# Patient Record
Sex: FEMALE | Race: WHITE | NOT HISPANIC OR LATINO | Employment: FULL TIME | URBAN - METROPOLITAN AREA
[De-identification: names, ages, dates, MRNs, and addresses within clinical notes are randomized per-mention and may not be internally consistent; named-entity substitution may affect disease eponyms.]

---

## 2023-03-10 PROBLEM — J45.909 ASTHMA (HHS-HCC): Status: ACTIVE | Noted: 2023-03-10

## 2023-03-10 PROBLEM — R53.81 MALAISE AND FATIGUE: Status: ACTIVE | Noted: 2023-03-10

## 2023-03-10 PROBLEM — M54.50 CHRONIC BILATERAL LOW BACK PAIN WITHOUT SCIATICA: Status: ACTIVE | Noted: 2023-03-10

## 2023-03-10 PROBLEM — S20.20XA: Status: ACTIVE | Noted: 2023-03-10

## 2023-03-10 PROBLEM — G89.29 CHRONIC BILATERAL LOW BACK PAIN WITHOUT SCIATICA: Status: ACTIVE | Noted: 2023-03-10

## 2023-03-10 PROBLEM — F41.1 GENERALIZED ANXIETY DISORDER: Status: ACTIVE | Noted: 2023-03-10

## 2023-03-10 PROBLEM — R53.83 MALAISE AND FATIGUE: Status: ACTIVE | Noted: 2023-03-10

## 2023-03-10 PROBLEM — E66.811 CLASS 1 OBESITY WITH BODY MASS INDEX (BMI) OF 34.0 TO 34.9 IN ADULT: Status: ACTIVE | Noted: 2023-03-10

## 2023-03-10 PROBLEM — F98.8 ATTENTION DEFICIT DISORDER: Status: ACTIVE | Noted: 2023-03-10

## 2023-03-10 PROBLEM — E66.9 CLASS 1 OBESITY WITH BODY MASS INDEX (BMI) OF 34.0 TO 34.9 IN ADULT: Status: ACTIVE | Noted: 2023-03-10

## 2023-03-10 RX ORDER — ALBUTEROL SULFATE 90 UG/1
1 AEROSOL, METERED RESPIRATORY (INHALATION) EVERY 4 HOURS PRN
COMMUNITY

## 2023-03-10 RX ORDER — ESCITALOPRAM OXALATE 20 MG/1
1 TABLET ORAL DAILY
COMMUNITY

## 2023-03-10 RX ORDER — LISDEXAMFETAMINE DIMESYLATE CAPSULES 20 MG/1
1 CAPSULE ORAL DAILY
COMMUNITY

## 2023-03-13 ENCOUNTER — OFFICE VISIT (OUTPATIENT)
Dept: PRIMARY CARE | Facility: CLINIC | Age: 28
End: 2023-03-13
Payer: COMMERCIAL

## 2023-03-13 VITALS
BODY MASS INDEX: 34.4 KG/M2 | SYSTOLIC BLOOD PRESSURE: 123 MMHG | TEMPERATURE: 97.6 F | WEIGHT: 216.4 LBS | DIASTOLIC BLOOD PRESSURE: 76 MMHG

## 2023-03-13 DIAGNOSIS — M54.9 DISCOMFORT OF BACK: Primary | ICD-10-CM

## 2023-03-13 PROBLEM — S20.20XA: Status: RESOLVED | Noted: 2023-03-10 | Resolved: 2023-03-13

## 2023-03-13 PROCEDURE — 1036F TOBACCO NON-USER: CPT | Performed by: NURSE PRACTITIONER

## 2023-03-13 PROCEDURE — 99213 OFFICE O/P EST LOW 20 MIN: CPT | Performed by: NURSE PRACTITIONER

## 2023-03-13 RX ORDER — ESCITALOPRAM OXALATE 5 MG/1
5 TABLET ORAL DAILY
COMMUNITY
Start: 2023-02-28

## 2023-03-13 ASSESSMENT — ENCOUNTER SYMPTOMS
FEVER: 0
CHILLS: 0
FATIGUE: 0

## 2023-03-13 NOTE — PROGRESS NOTES
Subjective   Jia Anthony is a 27 y.o. female who presents for Cyst (On the small of the back).    HPI   She presents to the office today for evaluation of a lump to the left lower back near spine. This was first noticed 6-9 months ago. She reports it is not painful unless touched frequently.  She does not feel the area is raised but feels different.  No redness or inflammation.  ? A little bit bigger.  No fever or chills.  She is feeling well otherwise.    Review of Systems   Constitutional:  Negative for chills, fatigue and fever.   Skin:  Negative for rash.       Objective   /76 (BP Location: Left arm, Patient Position: Sitting)   Temp 36.4 °C (97.6 °F) (Temporal)   Wt 98.2 kg (216 lb 6.4 oz)   BMI 34.40 kg/m²     Physical Exam  Constitutional:       General: She is not in acute distress.     Appearance: Normal appearance. She is not toxic-appearing.   Cardiovascular:      Rate and Rhythm: Normal rate and regular rhythm.      Heart sounds: Normal heart sounds, S1 normal and S2 normal. No murmur heard.  Lymphadenopathy:      Cervical: No cervical adenopathy.   Skin:     Comments: (+) ecchymosis noted to the area of concern on the lower back. No palpable lump but there is a palpable area just lateral to both sides of the spine that is mobile.    Neurological:      Mental Status: She is alert and oriented to person, place, and time.   Psychiatric:         Mood and Affect: Mood and affect normal.         Speech: Speech normal.         Behavior: Behavior normal.         Thought Content: Thought content normal.         Judgment: Judgment normal.         Assessment/Plan   Problem List Items Addressed This Visit          Other    Discomfort of back - Primary     Monitor the area to the lower back.  Return to the office if larger or bothersome.

## 2023-05-12 ENCOUNTER — OFFICE VISIT (OUTPATIENT)
Dept: PRIMARY CARE | Facility: CLINIC | Age: 28
End: 2023-05-12
Payer: COMMERCIAL

## 2023-05-12 ENCOUNTER — LAB (OUTPATIENT)
Dept: LAB | Facility: LAB | Age: 28
End: 2023-05-12
Payer: COMMERCIAL

## 2023-05-12 VITALS
BODY MASS INDEX: 33.78 KG/M2 | TEMPERATURE: 97.9 F | DIASTOLIC BLOOD PRESSURE: 77 MMHG | SYSTOLIC BLOOD PRESSURE: 113 MMHG | HEART RATE: 78 BPM | HEIGHT: 67 IN | WEIGHT: 215.2 LBS

## 2023-05-12 DIAGNOSIS — L29.9 ITCHING: ICD-10-CM

## 2023-05-12 DIAGNOSIS — L29.9 ITCHING: Primary | ICD-10-CM

## 2023-05-12 PROBLEM — Z86.14 HISTORY OF MRSA INFECTION: Status: RESOLVED | Noted: 2021-01-28 | Resolved: 2023-05-12

## 2023-05-12 PROBLEM — Z86.16 HISTORY OF COVID-19: Status: RESOLVED | Noted: 2021-01-28 | Resolved: 2023-05-12

## 2023-05-12 PROBLEM — O13.9 GESTATIONAL HYPERTENSION (HHS-HCC): Status: RESOLVED | Noted: 2021-01-28 | Resolved: 2023-05-12

## 2023-05-12 PROBLEM — F32.0 MAJOR DEPRESSIVE DISORDER, SINGLE EPISODE, MILD (CMS-HCC): Status: ACTIVE | Noted: 2022-01-11

## 2023-05-12 LAB
ALANINE AMINOTRANSFERASE (SGPT) (U/L) IN SER/PLAS: 22 U/L (ref 7–45)
ALBUMIN (G/DL) IN SER/PLAS: 4.1 G/DL (ref 3.4–5)
ALKALINE PHOSPHATASE (U/L) IN SER/PLAS: 40 U/L (ref 33–110)
ANION GAP IN SER/PLAS: 11 MMOL/L (ref 10–20)
ASPARTATE AMINOTRANSFERASE (SGOT) (U/L) IN SER/PLAS: 18 U/L (ref 9–39)
BILIRUBIN TOTAL (MG/DL) IN SER/PLAS: 0.5 MG/DL (ref 0–1.2)
CALCIUM (MG/DL) IN SER/PLAS: 9.3 MG/DL (ref 8.6–10.3)
CARBON DIOXIDE, TOTAL (MMOL/L) IN SER/PLAS: 25 MMOL/L (ref 21–32)
CHLORIDE (MMOL/L) IN SER/PLAS: 106 MMOL/L (ref 98–107)
CREATININE (MG/DL) IN SER/PLAS: 0.56 MG/DL (ref 0.5–1.05)
ERYTHROCYTE DISTRIBUTION WIDTH (RATIO) BY AUTOMATED COUNT: 13.3 % (ref 11.5–14.5)
ERYTHROCYTE MEAN CORPUSCULAR HEMOGLOBIN CONCENTRATION (G/DL) BY AUTOMATED: 33 G/DL (ref 32–36)
ERYTHROCYTE MEAN CORPUSCULAR VOLUME (FL) BY AUTOMATED COUNT: 87 FL (ref 80–100)
ERYTHROCYTES (10*6/UL) IN BLOOD BY AUTOMATED COUNT: 4.51 X10E12/L (ref 4–5.2)
GFR FEMALE: >90 ML/MIN/1.73M2
GLUCOSE (MG/DL) IN SER/PLAS: 82 MG/DL (ref 74–99)
HEMATOCRIT (%) IN BLOOD BY AUTOMATED COUNT: 39.4 % (ref 36–46)
HEMOGLOBIN (G/DL) IN BLOOD: 13 G/DL (ref 12–16)
LEUKOCYTES (10*3/UL) IN BLOOD BY AUTOMATED COUNT: 8.5 X10E9/L (ref 4.4–11.3)
PLATELETS (10*3/UL) IN BLOOD AUTOMATED COUNT: 274 X10E9/L (ref 150–450)
POTASSIUM (MMOL/L) IN SER/PLAS: 4.3 MMOL/L (ref 3.5–5.3)
PROTEIN TOTAL: 6.8 G/DL (ref 6.4–8.2)
SODIUM (MMOL/L) IN SER/PLAS: 138 MMOL/L (ref 136–145)
UREA NITROGEN (MG/DL) IN SER/PLAS: 10 MG/DL (ref 6–23)

## 2023-05-12 PROCEDURE — 1036F TOBACCO NON-USER: CPT | Performed by: NURSE PRACTITIONER

## 2023-05-12 PROCEDURE — 99213 OFFICE O/P EST LOW 20 MIN: CPT | Performed by: NURSE PRACTITIONER

## 2023-05-12 PROCEDURE — 85027 COMPLETE CBC AUTOMATED: CPT

## 2023-05-12 PROCEDURE — 36415 COLL VENOUS BLD VENIPUNCTURE: CPT

## 2023-05-12 PROCEDURE — 80053 COMPREHEN METABOLIC PANEL: CPT

## 2023-05-12 ASSESSMENT — ENCOUNTER SYMPTOMS
FEVER: 0
CHILLS: 0
FATIGUE: 0

## 2023-05-12 NOTE — PROGRESS NOTES
"Subjective   Jia Anthony is a 28 y.o. female who presents for Itching (Random all over).    HPI   She presents to the office today for evaluation of itching for 3 months. Generally arms,stomach, legs and back. Can affect random areas.  At times affects the scalp but never the face.  No change in shampoo or lotions.  No new products.  No new sheets or clothing.  She is 8 weeks pregnant- symptoms started before being pregnant.  No rash or hives.  Gets hives or red spots from scratching.  Stopped Vyvanse and Lexapro about 4 weeks ago.  Reports she has been really stressed recently from work.  No fever.  (+) chills at night since pregnant.  (+) nausea but no vomiting with pregnancy.  Feeling well otherwise.  No gardening or outdoor activity.    Changed shampoo to an anti-itch- helped a little bit    Review of Systems   Constitutional:  Negative for chills, fatigue and fever.   Skin:  Negative for rash.     Objective   /77   Pulse 78   Temp 36.6 °C (97.9 °F) (Temporal)   Ht 1.702 m (5' 7\")   Wt 97.6 kg (215 lb 3.2 oz)   BMI 33.71 kg/m²     Physical Exam  Constitutional:       General: She is not in acute distress.     Appearance: Normal appearance. She is not toxic-appearing.   Eyes:      Extraocular Movements: Extraocular movements intact.      Conjunctiva/sclera: Conjunctivae normal.      Pupils: Pupils are equal, round, and reactive to light.   Cardiovascular:      Rate and Rhythm: Normal rate and regular rhythm.      Pulses: Normal pulses.      Heart sounds: Normal heart sounds, S1 normal and S2 normal. No murmur heard.  Pulmonary:      Effort: Pulmonary effort is normal. No respiratory distress.      Breath sounds: Normal breath sounds.   Musculoskeletal:      Right lower leg: No edema.      Left lower leg: No edema.   Lymphadenopathy:      Cervical: No cervical adenopathy.   Skin:     General: Skin is warm.      Findings: No rash.   Neurological:      Mental Status: She is alert and oriented to " person, place, and time.   Psychiatric:         Attention and Perception: Attention normal.         Mood and Affect: Mood and affect normal.         Behavior: Behavior normal. Behavior is cooperative.         Thought Content: Thought content normal.         Cognition and Memory: Cognition normal.         Judgment: Judgment normal.         Assessment/Plan   Problem List Items Addressed This Visit          Nervous    Itching - Primary     No associated rash. Suspect due to stress. Will check labs and continue to monitor.         Relevant Orders    CBC    Comprehensive Metabolic Panel

## 2024-04-29 ENCOUNTER — APPOINTMENT (OUTPATIENT)
Dept: PRIMARY CARE | Facility: CLINIC | Age: 29
End: 2024-04-29
Payer: COMMERCIAL

## 2024-06-30 ASSESSMENT — PROMIS GLOBAL HEALTH SCALE
RATE_SOCIAL_SATISFACTION: VERY GOOD
RATE_QUALITY_OF_LIFE: GOOD
RATE_PHYSICAL_HEALTH: FAIR
EMOTIONAL_PROBLEMS: ALWAYS
CARRYOUT_PHYSICAL_ACTIVITIES: COMPLETELY
CARRYOUT_SOCIAL_ACTIVITIES: GOOD
RATE_AVERAGE_FATIGUE: MILD
RATE_MENTAL_HEALTH: FAIR
RATE_AVERAGE_PAIN: 0
RATE_GENERAL_HEALTH: GOOD

## 2024-07-01 ENCOUNTER — APPOINTMENT (OUTPATIENT)
Dept: PRIMARY CARE | Facility: CLINIC | Age: 29
End: 2024-07-01
Payer: COMMERCIAL

## 2024-07-01 VITALS
TEMPERATURE: 97.2 F | BODY MASS INDEX: 39.49 KG/M2 | DIASTOLIC BLOOD PRESSURE: 74 MMHG | OXYGEN SATURATION: 98 % | HEIGHT: 67 IN | SYSTOLIC BLOOD PRESSURE: 104 MMHG | HEART RATE: 91 BPM | WEIGHT: 251.6 LBS

## 2024-07-01 DIAGNOSIS — F32.0 MAJOR DEPRESSIVE DISORDER, SINGLE EPISODE, MILD (CMS-HCC): ICD-10-CM

## 2024-07-01 DIAGNOSIS — F41.1 GENERALIZED ANXIETY DISORDER: Primary | ICD-10-CM

## 2024-07-01 DIAGNOSIS — F98.8 ATTENTION DEFICIT DISORDER, UNSPECIFIED HYPERACTIVITY PRESENCE: ICD-10-CM

## 2024-07-01 PROCEDURE — 1036F TOBACCO NON-USER: CPT | Performed by: NURSE PRACTITIONER

## 2024-07-01 PROCEDURE — 99214 OFFICE O/P EST MOD 30 MIN: CPT | Performed by: NURSE PRACTITIONER

## 2024-07-01 RX ORDER — SERTRALINE HYDROCHLORIDE 50 MG/1
50 TABLET, FILM COATED ORAL DAILY
Qty: 30 TABLET | Refills: 1 | Status: SHIPPED | OUTPATIENT
Start: 2024-07-01

## 2024-07-01 ASSESSMENT — ENCOUNTER SYMPTOMS
NAUSEA: 0
VOMITING: 0
SLEEP DISTURBANCE: 0
HEADACHES: 1
COUGH: 0
NUMBNESS: 0
DIZZINESS: 0
CHILLS: 0
FATIGUE: 1
SHORTNESS OF BREATH: 0
DIARRHEA: 0
ABDOMINAL PAIN: 0
DYSPHORIC MOOD: 1
WEAKNESS: 0
FEVER: 0
CHEST TIGHTNESS: 0
NERVOUS/ANXIOUS: 1
PALPITATIONS: 0

## 2024-07-01 ASSESSMENT — ANXIETY QUESTIONNAIRES
5. BEING SO RESTLESS THAT IT IS HARD TO SIT STILL: NOT AT ALL
6. BECOMING EASILY ANNOYED OR IRRITABLE: MORE THAN HALF THE DAYS
4. TROUBLE RELAXING: NOT AT ALL
7. FEELING AFRAID AS IF SOMETHING AWFUL MIGHT HAPPEN: NEARLY EVERY DAY
1. FEELING NERVOUS, ANXIOUS, OR ON EDGE: NEARLY EVERY DAY
2. NOT BEING ABLE TO STOP OR CONTROL WORRYING: NEARLY EVERY DAY
3. WORRYING TOO MUCH ABOUT DIFFERENT THINGS: NEARLY EVERY DAY
GAD7 TOTAL SCORE: 14
IF YOU CHECKED OFF ANY PROBLEMS ON THIS QUESTIONNAIRE, HOW DIFFICULT HAVE THESE PROBLEMS MADE IT FOR YOU TO DO YOUR WORK, TAKE CARE OF THINGS AT HOME, OR GET ALONG WITH OTHER PEOPLE: SOMEWHAT DIFFICULT

## 2024-07-01 ASSESSMENT — PATIENT HEALTH QUESTIONNAIRE - PHQ9
SUM OF ALL RESPONSES TO PHQ9 QUESTIONS 1 AND 2: 0
1. LITTLE INTEREST OR PLEASURE IN DOING THINGS: NOT AT ALL
2. FEELING DOWN, DEPRESSED OR HOPELESS: NOT AT ALL

## 2024-07-01 NOTE — PROGRESS NOTES
Subjective    Jia Anthony is a 29 y.o. female who presents for Anxiety (Pt would like to restart medication- she stopped taking her medication when she pregnant. ).    Anxiety  Symptoms include nervous/anxious behavior. Patient reports no chest pain, dizziness, nausea, palpitations, shortness of breath or suicidal ideas.       She presents to the office today to discuss anxiety and depression.  She is 6 months postpartum.  Has been off her medications since pregnancy.  Did not really feel Lexapro was helping much prior to stopping.  Feeling sad at times  (+)emotional  Rarely feeling down, helpless or hopeless.  Motivation is ok  No SI or HI.  (+) excessive worry.  (+) intrusive thoughts about worst case scenarios  No panic attacks.  Sleep is disrupted with baby waking up but otherwise would be sleeping well.   Diet: Could be better. Working on healthier eating  Has had increased hunger since breastfeeding.  Exercise: Goes on walks- no scheduled exercise  Caffeine use: 100 mg or less  Alcohol use: none  Drug use: None    She reports she is also having difficulty focusing.   Has a hard time getting focused to start a task,  Gets side tracked and doesn't finish things.  This contributes to mind racing.   Would like to restart Vyvanse once done breastfeeding.    Review of Systems   Constitutional:  Positive for fatigue. Negative for chills and fever.   Eyes:  Negative for visual disturbance.   Respiratory:  Negative for cough, chest tightness and shortness of breath.    Cardiovascular:  Negative for chest pain, palpitations and leg swelling.   Gastrointestinal:  Negative for abdominal pain, diarrhea, nausea and vomiting.   Neurological:  Positive for headaches. Negative for dizziness, weakness and numbness.   Psychiatric/Behavioral:  Positive for dysphoric mood. Negative for self-injury, sleep disturbance and suicidal ideas. The patient is nervous/anxious.        Objective   /74 (BP Location: Left arm, Patient  "Position: Sitting)   Pulse 91   Temp 36.2 °C (97.2 °F) (Temporal)   Ht 1.69 m (5' 6.54\")   Wt 114 kg (251 lb 9.6 oz)   SpO2 98%   BMI 39.96 kg/m²     Physical Exam  Constitutional:       General: She is not in acute distress.     Appearance: Normal appearance. She is not toxic-appearing.   Eyes:      Extraocular Movements: Extraocular movements intact.      Conjunctiva/sclera: Conjunctivae normal.      Pupils: Pupils are equal, round, and reactive to light.   Neck:      Thyroid: No thyroid mass or thyromegaly.   Cardiovascular:      Rate and Rhythm: Normal rate and regular rhythm.      Pulses: Normal pulses.      Heart sounds: Normal heart sounds, S1 normal and S2 normal. No murmur heard.  Pulmonary:      Effort: Pulmonary effort is normal. No respiratory distress.      Breath sounds: Normal breath sounds.   Abdominal:      General: Bowel sounds are normal.      Palpations: Abdomen is soft.      Tenderness: There is no abdominal tenderness.   Musculoskeletal:      Right lower leg: No edema.      Left lower leg: No edema.   Lymphadenopathy:      Cervical: No cervical adenopathy.   Neurological:      Mental Status: She is alert and oriented to person, place, and time.   Psychiatric:         Attention and Perception: Attention normal.         Mood and Affect: Mood and affect normal.         Behavior: Behavior normal. Behavior is cooperative.         Thought Content: Thought content normal.         Cognition and Memory: Cognition normal.         Judgment: Judgment normal.         Assessment/Plan   Problem List Items Addressed This Visit       Attention deficit disorder     Will restart Vyvanse once done breastfeeding.         Generalized anxiety disorder - Primary     Start Sertraline as ordered. May increase if needed.           Relevant Medications    sertraline (Zoloft) 50 mg tablet    Major depressive disorder, single episode, mild (CMS-HCC)    Relevant Medications    sertraline (Zoloft) 50 mg tablet       It " has been a pleasure seeing you today!

## 2024-08-14 ENCOUNTER — APPOINTMENT (OUTPATIENT)
Dept: PRIMARY CARE | Facility: CLINIC | Age: 29
End: 2024-08-14
Payer: COMMERCIAL

## 2024-08-14 DIAGNOSIS — F41.1 GENERALIZED ANXIETY DISORDER: Primary | ICD-10-CM

## 2024-08-14 DIAGNOSIS — F32.0 MAJOR DEPRESSIVE DISORDER, SINGLE EPISODE, MILD (CMS-HCC): ICD-10-CM

## 2024-08-14 PROCEDURE — 99213 OFFICE O/P EST LOW 20 MIN: CPT | Performed by: NURSE PRACTITIONER

## 2024-08-14 PROCEDURE — 1036F TOBACCO NON-USER: CPT | Performed by: NURSE PRACTITIONER

## 2024-08-14 RX ORDER — SERTRALINE HYDROCHLORIDE 50 MG/1
50 TABLET, FILM COATED ORAL DAILY
Qty: 90 TABLET | Refills: 0 | Status: SHIPPED | OUTPATIENT
Start: 2024-08-14

## 2024-08-14 ASSESSMENT — PATIENT HEALTH QUESTIONNAIRE - PHQ9
1. LITTLE INTEREST OR PLEASURE IN DOING THINGS: NOT AT ALL
SUM OF ALL RESPONSES TO PHQ9 QUESTIONS 1 AND 2: 0
2. FEELING DOWN, DEPRESSED OR HOPELESS: NOT AT ALL

## 2024-08-14 ASSESSMENT — ENCOUNTER SYMPTOMS
FEVER: 0
FATIGUE: 1
SLEEP DISTURBANCE: 0
CHILLS: 0
NERVOUS/ANXIOUS: 1
DYSPHORIC MOOD: 0

## 2024-08-14 NOTE — PROGRESS NOTES
Subjective   Jia Anthony is a 29 y.o. female who presents for 6 wk fu (On Virtual).    An interactive audio and video telecommunication system which permits real time communications between the patient (at the originating site) and provider (at the distant site) was utilized to provide this telehealth service.    Verbal consent was requested and obtained from Jia Anthony for a telehealth visit. All issues as below were discussed and addressed but no physical exam was performed. If it was felt that the patient should be evaluated in the clinic then they were directed there. The patient verbally consented to the visit.  She was located in the [facility] for the visit.       HPI  She presents today virtually for a follow-up on anxiety.  She has been on the Sertraline for about 4 weeks.  She is taking the medication as prescribed. Taking daily in the a.m. or afternoon.  No significant side effects noted.  She reports she is tired and has had mild headaches but feels this is due to staying up late.  No feeling sad, down, helpless or hopeless.  Motivation is average.  No SI or HI.  Anxiety has improved since starting the medication.  It seems to be taking the edge off.  Her every day anxiety is better.  She does not worry about little things every day.  However, big things still create a lot of anxiety for her.  No panic attacks reported.  Sleeping okay at night  Diet: Overall pretty healthy.  Exercise: Taking walks with kids.  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  She is in the process of weaning from breast-feeding.  She is down to just her nightly nursing session.  As soon as she is done breast-feeding, she would like to get back on the Vyvanse.    Review of Systems   Constitutional:  Positive for fatigue. Negative for chills and fever.   Psychiatric/Behavioral:  Negative for dysphoric mood, self-injury, sleep disturbance and suicidal ideas. The patient is nervous/anxious.        Objective    There were no vitals taken for this visit.    Physical Exam  Constitutional:       General: She is not in acute distress.     Appearance: Normal appearance. She is not toxic-appearing.   Pulmonary:      Effort: Pulmonary effort is normal. No respiratory distress.   Neurological:      Mental Status: She is alert and oriented to person, place, and time.   Psychiatric:         Mood and Affect: Mood normal.         Behavior: Behavior normal.         Thought Content: Thought content normal.         Judgment: Judgment normal.         Assessment/Plan   Problem List Items Addressed This Visit       Generalized anxiety disorder - Primary     Improved on the current dose of Sertraline.  Will continue at the current dose.  Plan to follow-up in 6 to 8 weeks.  Will increase medication at that time if needed.         Relevant Medications    sertraline (Zoloft) 50 mg tablet    Major depressive disorder, single episode, mild (CMS-HCC)     Stable on the current dose of Sertraline.         Relevant Medications    sertraline (Zoloft) 50 mg tablet       It has been a pleasure seeing you today!

## 2024-08-14 NOTE — ASSESSMENT & PLAN NOTE
Improved on the current dose of Sertraline.  Will continue at the current dose.  Plan to follow-up in 6 to 8 weeks.  Will increase medication at that time if needed.

## 2024-09-24 ENCOUNTER — APPOINTMENT (OUTPATIENT)
Dept: PRIMARY CARE | Facility: CLINIC | Age: 29
End: 2024-09-24
Payer: COMMERCIAL

## 2024-11-01 ENCOUNTER — APPOINTMENT (OUTPATIENT)
Dept: PRIMARY CARE | Facility: CLINIC | Age: 29
End: 2024-11-01
Payer: COMMERCIAL

## 2024-11-01 VITALS
DIASTOLIC BLOOD PRESSURE: 90 MMHG | SYSTOLIC BLOOD PRESSURE: 112 MMHG | WEIGHT: 258.4 LBS | HEART RATE: 84 BPM | OXYGEN SATURATION: 98 % | BODY MASS INDEX: 41.04 KG/M2 | TEMPERATURE: 97.6 F

## 2024-11-01 DIAGNOSIS — F90.9 ATTENTION DEFICIT HYPERACTIVITY DISORDER (ADHD), UNSPECIFIED ADHD TYPE: Primary | ICD-10-CM

## 2024-11-01 DIAGNOSIS — F41.1 GENERALIZED ANXIETY DISORDER: ICD-10-CM

## 2024-11-01 DIAGNOSIS — Z51.81 THERAPEUTIC DRUG MONITORING: ICD-10-CM

## 2024-11-01 DIAGNOSIS — F32.0 MAJOR DEPRESSIVE DISORDER, SINGLE EPISODE, MILD (CMS-HCC): ICD-10-CM

## 2024-11-01 LAB
AMPHETAMINES UR QL SCN: NORMAL
BARBITURATES UR QL SCN: NORMAL
BENZODIAZ UR QL SCN: NORMAL
BZE UR QL SCN: NORMAL
CANNABINOIDS UR QL SCN: NORMAL
FENTANYL+NORFENTANYL UR QL SCN: NORMAL
METHADONE UR QL SCN: NORMAL
OPIATES UR QL SCN: NORMAL
OXYCODONE+OXYMORPHONE UR QL SCN: NORMAL
PCP UR QL SCN: NORMAL

## 2024-11-01 PROCEDURE — 99214 OFFICE O/P EST MOD 30 MIN: CPT | Performed by: NURSE PRACTITIONER

## 2024-11-01 PROCEDURE — 80307 DRUG TEST PRSMV CHEM ANLYZR: CPT

## 2024-11-01 RX ORDER — LISDEXAMFETAMINE DIMESYLATE 20 MG/1
20 CAPSULE ORAL EVERY MORNING
Qty: 30 CAPSULE | Refills: 0 | Status: SHIPPED | OUTPATIENT
Start: 2024-12-31 | End: 2025-01-30

## 2024-11-01 RX ORDER — LISDEXAMFETAMINE DIMESYLATE 20 MG/1
20 CAPSULE ORAL EVERY MORNING
Qty: 30 CAPSULE | Refills: 0 | Status: SHIPPED | OUTPATIENT
Start: 2024-11-01 | End: 2024-12-01

## 2024-11-01 RX ORDER — LISDEXAMFETAMINE DIMESYLATE 20 MG/1
20 CAPSULE ORAL EVERY MORNING
Qty: 30 CAPSULE | Refills: 0 | Status: SHIPPED | OUTPATIENT
Start: 2024-12-01 | End: 2024-12-31

## 2024-11-02 ASSESSMENT — ENCOUNTER SYMPTOMS
CHILLS: 0
FATIGUE: 0
NERVOUS/ANXIOUS: 1
FEVER: 0
SLEEP DISTURBANCE: 0
DYSPHORIC MOOD: 0
SHORTNESS OF BREATH: 0

## 2025-01-17 ENCOUNTER — APPOINTMENT (OUTPATIENT)
Dept: PRIMARY CARE | Facility: CLINIC | Age: 30
End: 2025-01-17
Payer: COMMERCIAL

## 2025-01-17 VITALS
BODY MASS INDEX: 39.83 KG/M2 | SYSTOLIC BLOOD PRESSURE: 112 MMHG | OXYGEN SATURATION: 97 % | DIASTOLIC BLOOD PRESSURE: 88 MMHG | HEART RATE: 96 BPM | TEMPERATURE: 97.5 F | WEIGHT: 250.8 LBS

## 2025-01-17 DIAGNOSIS — F41.1 GENERALIZED ANXIETY DISORDER: ICD-10-CM

## 2025-01-17 DIAGNOSIS — F90.9 ATTENTION DEFICIT HYPERACTIVITY DISORDER (ADHD), UNSPECIFIED ADHD TYPE: Primary | ICD-10-CM

## 2025-01-17 DIAGNOSIS — F32.0 MAJOR DEPRESSIVE DISORDER, SINGLE EPISODE, MILD (CMS-HCC): ICD-10-CM

## 2025-01-17 PROCEDURE — 99214 OFFICE O/P EST MOD 30 MIN: CPT | Performed by: NURSE PRACTITIONER

## 2025-01-17 PROCEDURE — 1036F TOBACCO NON-USER: CPT | Performed by: NURSE PRACTITIONER

## 2025-01-17 RX ORDER — LISDEXAMFETAMINE DIMESYLATE 20 MG/1
20 CAPSULE ORAL EVERY MORNING
Qty: 30 CAPSULE | Refills: 0 | Status: SHIPPED | OUTPATIENT
Start: 2025-03-31 | End: 2025-04-30

## 2025-01-17 RX ORDER — SERTRALINE HYDROCHLORIDE 50 MG/1
50 TABLET, FILM COATED ORAL DAILY
Qty: 90 TABLET | Refills: 0 | Status: SHIPPED | OUTPATIENT
Start: 2025-01-17

## 2025-01-17 RX ORDER — LISDEXAMFETAMINE DIMESYLATE 20 MG/1
20 CAPSULE ORAL EVERY MORNING
Qty: 30 CAPSULE | Refills: 0 | Status: SHIPPED | OUTPATIENT
Start: 2025-01-30 | End: 2025-03-01

## 2025-01-17 RX ORDER — LISDEXAMFETAMINE DIMESYLATE 20 MG/1
20 CAPSULE ORAL EVERY MORNING
Qty: 30 CAPSULE | Refills: 0 | Status: SHIPPED | OUTPATIENT
Start: 2025-03-01 | End: 2025-03-31

## 2025-01-17 ASSESSMENT — ENCOUNTER SYMPTOMS
SLEEP DISTURBANCE: 0
DIZZINESS: 0
FEVER: 0
FATIGUE: 0
WEAKNESS: 0
HEADACHES: 0
DYSPHORIC MOOD: 0
NAUSEA: 0
PALPITATIONS: 0
CHILLS: 0
COUGH: 0
NUMBNESS: 0
ABDOMINAL PAIN: 0
CHEST TIGHTNESS: 0
VOMITING: 0
DIARRHEA: 0
SHORTNESS OF BREATH: 0
NERVOUS/ANXIOUS: 1

## 2025-01-17 ASSESSMENT — PATIENT HEALTH QUESTIONNAIRE - PHQ9
2. FEELING DOWN, DEPRESSED OR HOPELESS: NOT AT ALL
SUM OF ALL RESPONSES TO PHQ9 QUESTIONS 1 AND 2: 0
1. LITTLE INTEREST OR PLEASURE IN DOING THINGS: NOT AT ALL

## 2025-01-17 NOTE — PATIENT INSTRUCTIONS
Restart Sertraline-take 25 mg (a half tablet) daily for 1 week, then increase to 50 mg daily (1 tablet).  Let me know how you are doing in 6 weeks.  Continue Vyvanse at the current dose.  Plan to follow-up in 3 months.

## 2025-01-17 NOTE — ASSESSMENT & PLAN NOTE
Restart sertraline 25 mg for 1 week, then increase to 50 mg daily.  She will update me in 6 weeks on how she is doing.  Plan to follow-up in 3 months.

## 2025-01-17 NOTE — PROGRESS NOTES
Subjective   Jia Anthony is a 29 y.o. female who presents for 2 mon fu.    HPI  She presents to the office today for medication refills and follow up.  She reports she is doing well on the Vyvanse. She is taking around 6:30 am-8:30 am. No side effects other than anxiety is heightened.   No chest pain, SOB or palpitations.   (+) soreness in the right armpit into chest- switched up bra and the way she is holding baby and it is improving.    OARRS:  Josefina Thompson, MANDEEP-CNP on 1/17/2025  8:36 AM  I have personally reviewed the OARRS report for Jia Anthony. I have considered the risks of abuse, dependence, addiction and diversion and I believe that it is clinically appropriate for Jia Anthony to be prescribed this medication.    Is the patient prescribed a combination of a benzodiazepine and opioid?  No    Last Urine Drug Screen / ordered today: Yes  Recent Results (from the past 8760 hours)   Drug Screen, Urine With Reflex to Confirmation    Collection Time: 11/01/24  3:22 PM   Result Value Ref Range    Amphetamine Screen, Urine Presumptive Negative Presumptive Negative    Barbiturate Screen, Urine Presumptive Negative Presumptive Negative    Benzodiazepines Screen, Urine Presumptive Negative Presumptive Negative    Cannabinoid Screen, Urine Presumptive Negative Presumptive Negative    Cocaine Metabolite Screen, Urine Presumptive Negative Presumptive Negative    Fentanyl Screen, Urine Presumptive Negative Presumptive Negative    Opiate Screen, Urine Presumptive Negative Presumptive Negative    Oxycodone Screen, Urine Presumptive Negative Presumptive Negative    PCP Screen, Urine Presumptive Negative Presumptive Negative    Methadone Screen, Urine Presumptive Negative Presumptive Negative     Results are as expected.         Controlled Substance Agreement:  Date of the Last Agreement: 11/1/2024  Reviewed Controlled Substance Agreement including but not limited to the benefits, risks, and alternatives to  treatment with a Controlled Substance medication(s).    Stimulants:   What is the patient's goal of therapy? Help with focus at work and at home    Is this being achieved with current treatment? yes    Activities of Daily Living:   Is your overall impression that this patient is benefiting (symptom reduction outweighs side effects) from stimulant therapy? Yes     1. Physical Functioning: Better  2. Family Relationship: Same  3. Social Relationship: Same  4. Mood: Better  5. Sleep Patterns: Same  6. Overall Function: Better    No feeling sad, down, helpless or hopeless.  Motivation is fair.  No SI or HI.  Does have some social anxiety as well. Very anxious when going places or in group settings.  (+) excessive worry.  (+) worry about worry about worst case scenarios- generally health related  (+) panic attacks once every 3 months -hyperventilates, overwhelmed and very anxious  Sleeping well at night.  Diet: Overall pretty healthy  Exercise: No scheduled exercise  Caffeine use: 100-150 mg of caffeine  Alcohol use: rare use  Drug use: None    Review of Systems   Constitutional:  Negative for chills, fatigue and fever.   Eyes:  Negative for visual disturbance.   Respiratory:  Negative for cough, chest tightness and shortness of breath.    Cardiovascular:  Negative for chest pain, palpitations and leg swelling.   Gastrointestinal:  Negative for abdominal pain, diarrhea, nausea and vomiting.   Neurological:  Negative for dizziness, weakness, numbness and headaches.   Psychiatric/Behavioral:  Negative for dysphoric mood, self-injury, sleep disturbance and suicidal ideas. The patient is nervous/anxious.        Objective   /88 (BP Location: Left arm, Patient Position: Sitting)   Pulse 96   Temp 36.4 °C (97.5 °F) (Temporal)   Wt 114 kg (250 lb 12.8 oz)   SpO2 97%   BMI 39.83 kg/m²     Physical Exam  Constitutional:       General: She is not in acute distress.     Appearance: Normal appearance. She is not  toxic-appearing.   Eyes:      Extraocular Movements: Extraocular movements intact.      Conjunctiva/sclera: Conjunctivae normal.      Pupils: Pupils are equal, round, and reactive to light.   Cardiovascular:      Rate and Rhythm: Normal rate and regular rhythm.      Pulses: Normal pulses.      Heart sounds: Normal heart sounds, S1 normal and S2 normal. No murmur heard.  Pulmonary:      Effort: Pulmonary effort is normal. No respiratory distress.      Breath sounds: Normal breath sounds.   Abdominal:      General: Bowel sounds are normal.      Palpations: Abdomen is soft.      Tenderness: There is no abdominal tenderness.   Musculoskeletal:      Right lower leg: No edema.      Left lower leg: No edema.   Lymphadenopathy:      Cervical: No cervical adenopathy.   Neurological:      Mental Status: She is alert and oriented to person, place, and time.   Psychiatric:         Attention and Perception: Attention normal.         Mood and Affect: Mood and affect normal.         Behavior: Behavior normal. Behavior is cooperative.         Thought Content: Thought content normal.         Cognition and Memory: Cognition normal.         Judgment: Judgment normal.         Assessment/Plan   Problem List Items Addressed This Visit       Attention deficit disorder - Primary     Well-controlled the current dose of Vyvanse.  Continue.  UDS and CSA up-to-date.         Relevant Medications    lisdexamfetamine (Vyvanse) 20 mg capsule (Start on 1/30/2025)    lisdexamfetamine (Vyvanse) 20 mg capsule (Start on 3/1/2025)    lisdexamfetamine (Vyvanse) 20 mg capsule (Start on 3/31/2025)    Generalized anxiety disorder     Restart sertraline 25 mg for 1 week, then increase to 50 mg daily.  She will update me in 6 weeks on how she is doing.  Plan to follow-up in 3 months.         Relevant Medications    sertraline (Zoloft) 50 mg tablet    Major depressive disorder, single episode, mild (CMS-HCC)     Stable- no depression currently.          It has  been a pleasure seeing you today!

## 2025-01-28 ENCOUNTER — APPOINTMENT (OUTPATIENT)
Dept: PRIMARY CARE | Facility: CLINIC | Age: 30
End: 2025-01-28
Payer: COMMERCIAL

## 2025-02-17 ENCOUNTER — APPOINTMENT (OUTPATIENT)
Dept: PRIMARY CARE | Facility: CLINIC | Age: 30
End: 2025-02-17
Payer: COMMERCIAL

## 2025-04-01 DIAGNOSIS — F90.9 ATTENTION DEFICIT HYPERACTIVITY DISORDER (ADHD), UNSPECIFIED ADHD TYPE: ICD-10-CM

## 2025-04-02 RX ORDER — LISDEXAMFETAMINE DIMESYLATE 20 MG/1
20 CAPSULE ORAL EVERY MORNING
Qty: 30 CAPSULE | Refills: 0 | Status: SHIPPED | OUTPATIENT
Start: 2025-04-02 | End: 2025-05-02

## 2025-04-23 ENCOUNTER — APPOINTMENT (OUTPATIENT)
Dept: PRIMARY CARE | Facility: CLINIC | Age: 30
End: 2025-04-23
Payer: COMMERCIAL

## 2025-04-23 VITALS
OXYGEN SATURATION: 97 % | WEIGHT: 242.8 LBS | BODY MASS INDEX: 38.56 KG/M2 | SYSTOLIC BLOOD PRESSURE: 118 MMHG | TEMPERATURE: 97.6 F | HEART RATE: 91 BPM | DIASTOLIC BLOOD PRESSURE: 78 MMHG

## 2025-04-23 DIAGNOSIS — Z00.00 HEALTHCARE MAINTENANCE: Primary | ICD-10-CM

## 2025-04-23 DIAGNOSIS — Z13.6 SCREENING FOR CARDIOVASCULAR CONDITION: ICD-10-CM

## 2025-04-23 DIAGNOSIS — F41.1 GENERALIZED ANXIETY DISORDER: ICD-10-CM

## 2025-04-23 DIAGNOSIS — Z13.0 SCREENING FOR DEFICIENCY ANEMIA: ICD-10-CM

## 2025-04-23 DIAGNOSIS — F90.9 ATTENTION DEFICIT HYPERACTIVITY DISORDER (ADHD), UNSPECIFIED ADHD TYPE: ICD-10-CM

## 2025-04-23 PROCEDURE — 99214 OFFICE O/P EST MOD 30 MIN: CPT | Performed by: NURSE PRACTITIONER

## 2025-04-23 PROCEDURE — 1036F TOBACCO NON-USER: CPT | Performed by: NURSE PRACTITIONER

## 2025-04-23 PROCEDURE — 99395 PREV VISIT EST AGE 18-39: CPT | Performed by: NURSE PRACTITIONER

## 2025-04-23 RX ORDER — SERTRALINE HYDROCHLORIDE 50 MG/1
50 TABLET, FILM COATED ORAL DAILY
Qty: 90 TABLET | Refills: 1 | Status: SHIPPED | OUTPATIENT
Start: 2025-04-23

## 2025-04-23 RX ORDER — LISDEXAMFETAMINE DIMESYLATE 30 MG/1
30 CAPSULE ORAL EVERY MORNING
Qty: 30 CAPSULE | Refills: 0 | Status: SHIPPED | OUTPATIENT
Start: 2025-05-02 | End: 2025-06-01

## 2025-04-23 RX ORDER — LISDEXAMFETAMINE DIMESYLATE 30 MG/1
30 CAPSULE ORAL EVERY MORNING
Qty: 30 CAPSULE | Refills: 0 | Status: SHIPPED | OUTPATIENT
Start: 2025-07-01 | End: 2025-07-31

## 2025-04-23 RX ORDER — LISDEXAMFETAMINE DIMESYLATE 30 MG/1
30 CAPSULE ORAL EVERY MORNING
Qty: 30 CAPSULE | Refills: 0 | Status: SHIPPED | OUTPATIENT
Start: 2025-06-01 | End: 2025-07-01

## 2025-04-23 ASSESSMENT — ENCOUNTER SYMPTOMS
FEVER: 0
RHINORRHEA: 0
UNEXPECTED WEIGHT CHANGE: 0
CHILLS: 0
ABDOMINAL PAIN: 0
ARTHRALGIAS: 0
POLYDIPSIA: 0
SPEECH DIFFICULTY: 0
FREQUENCY: 0
POLYPHAGIA: 0
EYE ITCHING: 0
DIZZINESS: 0
NAUSEA: 0
BACK PAIN: 1
DIFFICULTY URINATING: 0
HEADACHES: 0
SORE THROAT: 0
WEAKNESS: 0
NECK PAIN: 0
EYE REDNESS: 0
PALPITATIONS: 0
WHEEZING: 0
DYSPHORIC MOOD: 0
NERVOUS/ANXIOUS: 1
DYSURIA: 0
CONSTIPATION: 0
BRUISES/BLEEDS EASILY: 0
ADENOPATHY: 0
EYE PAIN: 0
VOMITING: 0
MYALGIAS: 0
SINUS PRESSURE: 0
PHOTOPHOBIA: 0
SLEEP DISTURBANCE: 0
DIARRHEA: 0
CHEST TIGHTNESS: 0
SHORTNESS OF BREATH: 0
HEMATURIA: 0
COUGH: 0
NUMBNESS: 0
BLOOD IN STOOL: 0
FATIGUE: 0
EYE DISCHARGE: 0

## 2025-04-23 NOTE — PROGRESS NOTES
Subjective   Jia Anthony is a 29 y.o. female who presents for Med Refill and Annual Exam (Pt is fasting. GYN: follows with [facility]. LMP: approx ).    Med Refill  Pertinent negatives include no abdominal pain, arthralgias, chest pain, chills, congestion, coughing, fatigue, fever, headaches, myalgias, nausea, neck pain, numbness, rash, sore throat, vomiting or weakness.     Last well exam: 3 years ago  Health has been good in general.   There have been no changes in the patients PMH, PSH, FH or social history. Reviewed today.  Diet: trying to eat more protein/ eating healthier  Exercise: Doing [employer] in [place] for the past 2 weeks-3 days a week  Dental: Regular dental exams. Brushes 2 times a day. Flosses occasionally.  Vision: Last eye exam:2024    Corrected with glasses  Tobacco Use: None  Alcohol Use:None  Sexually active:  and sexually active.    LMP: 3/28/2025  Starting to be more regular.  Duration: 3-5 days.  No cramps or heavy bleeding.  PAP: follows with GYN- due for a PAP  Birth control: condoms  Immunizations: UTD  Colonoscopy: No family history of colon cancer.  Mammogram: No family history of breast cancer.    Last labs: ordered today    She is taking the Sertraline as prescribed. Taking daily at night.  No side effects.  No feeling sad, down, helpless or hopeless. Occasionally feels down.  Motivation is pretty good.   No SI or HI.  Anxiety is better in general  Less excessive worry.  (+) worry about worst case scenarios ( better on the medication)  No panic attacks.  Counseling: not currently in therapy  Sleeping well a night   Caffeine use: under 100 mg a day  Alcohol use: rare- 1-2 times a year  Drug use: None    OARRS:  Josefina Thompson, MANDEEP-JEREMY on 2025  9:19 AM  I have personally reviewed the OARRS report for Jia Anthony. I have considered the risks of abuse, dependence, addiction and diversion and I believe that it is clinically appropriate for  Jia Anthony to be prescribed this medication    Is the patient prescribed a combination of a benzodiazepine and opioid?  No    Last Urine Drug Screen / ordered today: No  Recent Results (from the past 8760 hours)   Drug Screen, Urine With Reflex to Confirmation    Collection Time: 11/01/24  3:22 PM   Result Value Ref Range    Amphetamine Screen, Urine Presumptive Negative Presumptive Negative    Barbiturate Screen, Urine Presumptive Negative Presumptive Negative    Benzodiazepines Screen, Urine Presumptive Negative Presumptive Negative    Cannabinoid Screen, Urine Presumptive Negative Presumptive Negative    Cocaine Metabolite Screen, Urine Presumptive Negative Presumptive Negative    Fentanyl Screen, Urine Presumptive Negative Presumptive Negative    Opiate Screen, Urine Presumptive Negative Presumptive Negative    Oxycodone Screen, Urine Presumptive Negative Presumptive Negative    PCP Screen, Urine Presumptive Negative Presumptive Negative    Methadone Screen, Urine Presumptive Negative Presumptive Negative     Results are as expected.         Controlled Substance Agreement:  Date of the Last Agreement: 11/1/2024  Reviewed Controlled Substance Agreement including but not limited to the benefits, risks, and alternatives to treatment with a Controlled Substance medication(s).    Stimulants:   What is the patient's goal of therapy? Help focus while at school.  Also takes on the weekends.  Takes around 6:30 am during the week and 8AM on the weekends.  Is this being achieved with current treatment? Helpful but would like to increase dose.  Previously on Vyvanse 50 mg daily before last pregnancy.    Activities of Daily Living:   Is your overall impression that this patient is benefiting (symptom reduction outweighs side effects) from stimulant therapy? Yes     1. Physical Functioning: Better  2. Family Relationship: Better  3. Social Relationship: Better  4. Mood: Better  5. Sleep Patterns: Same  6. Overall  Function: Better      Review of Systems   Constitutional:  Negative for chills, fatigue, fever and unexpected weight change.   HENT:  Negative for congestion, ear pain, hearing loss, nosebleeds, postnasal drip, rhinorrhea, sinus pressure, sore throat and tinnitus.    Eyes:  Negative for photophobia, pain, discharge, redness, itching and visual disturbance.   Respiratory:  Negative for cough, chest tightness, shortness of breath and wheezing.    Cardiovascular:  Negative for chest pain, palpitations and leg swelling.   Gastrointestinal:  Negative for abdominal pain, blood in stool, constipation, diarrhea, nausea and vomiting.   Endocrine: Negative for cold intolerance, heat intolerance, polydipsia, polyphagia and polyuria.   Genitourinary:  Negative for difficulty urinating, dysuria, frequency, hematuria and urgency.   Musculoskeletal:  Positive for back pain. Negative for arthralgias, myalgias and neck pain.   Skin:  Negative for rash.   Neurological:  Negative for dizziness, syncope, speech difficulty, weakness, numbness and headaches.        Tingling in arms at night when sleeping. Goes away within a couple minutes after moving.   Hematological:  Negative for adenopathy. Does not bruise/bleed easily.   Psychiatric/Behavioral:  Negative for dysphoric mood, self-injury, sleep disturbance and suicidal ideas. The patient is nervous/anxious.        Objective   /78 (BP Location: Left arm, Patient Position: Sitting)   Pulse 91   Temp 36.4 °C (97.6 °F) (Temporal)   Wt 110 kg (242 lb 12.8 oz)   SpO2 97%   BMI 38.56 kg/m²     Physical Exam  Constitutional:       General: She is not in acute distress.     Appearance: Normal appearance. She is not toxic-appearing.   HENT:      Head: Normocephalic and atraumatic.      Right Ear: Tympanic membrane and ear canal normal.      Left Ear: Tympanic membrane and ear canal normal.      Nose: Nose normal.      Mouth/Throat:      Mouth: Mucous membranes are moist.       Pharynx: Oropharynx is clear.   Eyes:      Extraocular Movements: Extraocular movements intact.      Conjunctiva/sclera: Conjunctivae normal.      Pupils: Pupils are equal, round, and reactive to light.   Neck:      Thyroid: No thyroid mass or thyromegaly.   Cardiovascular:      Rate and Rhythm: Normal rate and regular rhythm.      Pulses: Normal pulses.      Heart sounds: Normal heart sounds, S1 normal and S2 normal. No murmur heard.  Pulmonary:      Effort: Pulmonary effort is normal. No respiratory distress.      Breath sounds: Normal breath sounds.   Abdominal:      General: Bowel sounds are normal.      Palpations: Abdomen is soft.      Tenderness: There is no abdominal tenderness.   Musculoskeletal:         General: Normal range of motion.      Cervical back: Normal range of motion and neck supple.      Right lower leg: No edema.      Left lower leg: No edema.   Lymphadenopathy:      Cervical: No cervical adenopathy.   Skin:     General: Skin is warm and dry.   Neurological:      Mental Status: She is alert and oriented to person, place, and time.      Cranial Nerves: No cranial nerve deficit.      Sensory: No sensory deficit.      Motor: No weakness.      Coordination: Coordination normal.      Gait: Gait normal.      Deep Tendon Reflexes: Reflexes are normal and symmetric. Reflexes normal.   Psychiatric:         Attention and Perception: Attention normal.         Mood and Affect: Mood and affect normal.         Speech: Speech normal.         Behavior: Behavior normal.         Thought Content: Thought content normal.         Judgment: Judgment normal.         Assessment/Plan   Problem List Items Addressed This Visit       Attention deficit disorder    Relevant Medications    lisdexamfetamine (Vyvanse) 30 mg capsule (Start on 5/2/2025)    lisdexamfetamine (Vyvanse) 30 mg capsule (Start on 6/1/2025)    lisdexamfetamine (Vyvanse) 30 mg capsule (Start on 7/1/2025)    Generalized anxiety disorder    Relevant  Medications    sertraline (Zoloft) 50 mg tablet     Other Visit Diagnoses         Healthcare maintenance    -  Primary    Relevant Orders    Comprehensive Metabolic Panel      Screening for deficiency anemia        Relevant Orders    CBC      Screening for cardiovascular condition        Relevant Orders    Lipid Panel        Continue to focus on a low sodium/low fat diet (whole grains, fresh fruits and vegetables, lean meats). Avoid foods high in sugar.  Increase exercise as tolerated.  Increase the Vyvanse to 30 mg daily.   Continue Sertraline at the current dose.   Check labs in a few months.  Follow up in 6 months for medication refills or sooner if needed.    It has been a pleasure seeing you today!

## 2025-04-23 NOTE — PATIENT INSTRUCTIONS
Focus on a low sodium/low fat diet (whole grains, fresh fruits and vegetables, lean meats). Avoid foods high in sugar.  Increase exercise as tolerated.  Increase the Vyvanse to 30 mg daily.   Continue Sertraline at the current dose.   Check labs in a few months.  Follow up in 6 months for medication refills or sooner if needed.

## 2025-05-07 DIAGNOSIS — F90.9 ATTENTION DEFICIT HYPERACTIVITY DISORDER (ADHD), UNSPECIFIED ADHD TYPE: ICD-10-CM

## 2025-05-07 RX ORDER — LISDEXAMFETAMINE DIMESYLATE 30 MG/1
30 CAPSULE ORAL EVERY MORNING
Qty: 30 CAPSULE | Refills: 0 | Status: SHIPPED | OUTPATIENT
Start: 2025-05-07 | End: 2025-06-06

## 2025-06-04 ENCOUNTER — DOCUMENTATION (OUTPATIENT)
Dept: PRIMARY CARE | Facility: CLINIC | Age: 30
End: 2025-06-04
Payer: COMMERCIAL

## 2025-06-04 DIAGNOSIS — F90.9 ATTENTION DEFICIT HYPERACTIVITY DISORDER (ADHD), UNSPECIFIED ADHD TYPE: ICD-10-CM

## 2025-06-04 RX ORDER — LISDEXAMFETAMINE DIMESYLATE 30 MG/1
30 CAPSULE ORAL EVERY MORNING
Qty: 30 CAPSULE | Refills: 0 | Status: SHIPPED | OUTPATIENT
Start: 2025-06-04 | End: 2025-07-04

## 2025-06-06 ENCOUNTER — OFFICE VISIT (OUTPATIENT)
Dept: PRIMARY CARE | Facility: CLINIC | Age: 30
End: 2025-06-06
Payer: COMMERCIAL

## 2025-06-06 VITALS
HEART RATE: 77 BPM | DIASTOLIC BLOOD PRESSURE: 76 MMHG | TEMPERATURE: 97.7 F | SYSTOLIC BLOOD PRESSURE: 110 MMHG | BODY MASS INDEX: 37.7 KG/M2 | WEIGHT: 237.4 LBS | OXYGEN SATURATION: 98 %

## 2025-06-06 DIAGNOSIS — K62.5 RECTAL BLEEDING: Primary | ICD-10-CM

## 2025-06-06 DIAGNOSIS — K59.00 CONSTIPATION, UNSPECIFIED CONSTIPATION TYPE: ICD-10-CM

## 2025-06-06 PROCEDURE — 1036F TOBACCO NON-USER: CPT | Performed by: NURSE PRACTITIONER

## 2025-06-06 PROCEDURE — 99213 OFFICE O/P EST LOW 20 MIN: CPT | Performed by: NURSE PRACTITIONER

## 2025-06-06 ASSESSMENT — ENCOUNTER SYMPTOMS
CONSTIPATION: 1
VOMITING: 0
ABDOMINAL PAIN: 0
FEVER: 0
FATIGUE: 0
ANAL BLEEDING: 1
CHILLS: 0
HEMATOCHEZIA: 1
DIARRHEA: 0
NAUSEA: 0
BLOOD IN STOOL: 0

## 2025-06-06 ASSESSMENT — PATIENT HEALTH QUESTIONNAIRE - PHQ9
SUM OF ALL RESPONSES TO PHQ9 QUESTIONS 1 AND 2: 0
2. FEELING DOWN, DEPRESSED OR HOPELESS: NOT AT ALL
1. LITTLE INTEREST OR PLEASURE IN DOING THINGS: NOT AT ALL

## 2025-06-06 NOTE — PROGRESS NOTES
Subjective   Jia Anthony is a 30 y.o. female who presents for Rectal Bleeding (Started 6 weeks ago- does not happen all the time. Pt reports when she wipes after a BM that she'll have a bright red color and sometimes there is blood on the BM. Pt denies blood filling the toilet bowl. ).    Rectal Bleeding  Pertinent negatives include no abdominal pain, chills, fatigue, fever, nausea or vomiting.     She presents to the office today for evaluation of rectal bleeding after a bowel movement. This started about 6 weeks ago.   It first started after passing a stool that was hard and was a little painful.  There was no blood in the stool itself. Bright red with wiping.  Since then it has continued intermittently. Stopped for a week and then started back up. It is occurring about twice a week.   Tends to notice it when BM are harder and has to strain. No pain but feels different.  Generally is a small area of bright red on brown stool, a drop in the toilet, or bright red with wiping.  She never fills the toilet.  She never has any blood on underwear.    Normal stool- generally has 2 BM a day  No abdominal pain, nausea, vomiting or diarrhea.  No fever or chills.  No unintended weight loss.  She has looked externally and does not see any hemorrhoids.      Eating differently- in March started eating more protein/ healthier  Not a lot of fruits and vegetables.   Drinking a lot of water.  Exercise: [employer] in [place]     No family history of IBD or colon cancers.      Review of Systems   Constitutional:  Negative for chills, fatigue and fever.   Gastrointestinal:  Positive for anal bleeding, constipation and hematochezia. Negative for abdominal pain, blood in stool, diarrhea, nausea and vomiting.     Objective   /76 (BP Location: Left arm, Patient Position: Sitting)   Pulse 77   Temp 36.5 °C (97.7 °F) (Temporal)   Wt 108 kg (237 lb 6.4 oz)   SpO2 98%   BMI 37.70 kg/m²     Physical Exam  Constitutional:        General: She is not in acute distress.     Appearance: Normal appearance. She is not toxic-appearing.   Cardiovascular:      Rate and Rhythm: Normal rate and regular rhythm.      Pulses: Normal pulses.      Heart sounds: Normal heart sounds, S1 normal and S2 normal. No murmur heard.  Pulmonary:      Effort: Pulmonary effort is normal. No respiratory distress.      Breath sounds: Normal breath sounds.   Abdominal:      General: Bowel sounds are normal.      Palpations: Abdomen is soft.      Tenderness: There is no abdominal tenderness.   Neurological:      Mental Status: She is alert and oriented to person, place, and time.   Psychiatric:         Attention and Perception: Attention normal.         Mood and Affect: Mood and affect normal.         Behavior: Behavior normal. Behavior is cooperative.         Thought Content: Thought content normal.         Cognition and Memory: Cognition normal.         Judgment: Judgment normal.         Assessment/Plan   Problem List Items Addressed This Visit    None  Visit Diagnoses         Rectal bleeding    -  Primary      Constipation, unspecified constipation type            Suspect an internal hemorrhoid or tear since symptoms are intermittent and generally with harder stools.   Advised to focus on slowly increasing the fiber in her diet.  Stay well hydrated. Continue with exercise.  She is to let me know if symptoms persist and will refer to colon and rectal specialist.    It has been a pleasure seeing you today!

## 2025-06-06 NOTE — PATIENT INSTRUCTIONS
Focus on slowly increasing the fiber in your diet.  Stay well hydrated. Continue with exercise.  Let me know if symptoms persist.

## 2025-06-20 ENCOUNTER — APPOINTMENT (OUTPATIENT)
Dept: PRIMARY CARE | Facility: CLINIC | Age: 30
End: 2025-06-20
Payer: COMMERCIAL

## 2025-06-30 DIAGNOSIS — F90.9 ATTENTION DEFICIT HYPERACTIVITY DISORDER (ADHD), UNSPECIFIED ADHD TYPE: ICD-10-CM

## 2025-06-30 RX ORDER — LISDEXAMFETAMINE DIMESYLATE 30 MG/1
30 CAPSULE ORAL EVERY MORNING
Qty: 30 CAPSULE | Refills: 0 | Status: SHIPPED | OUTPATIENT
Start: 2025-07-03 | End: 2025-08-02

## 2025-07-29 DIAGNOSIS — F90.9 ATTENTION DEFICIT HYPERACTIVITY DISORDER (ADHD), UNSPECIFIED ADHD TYPE: ICD-10-CM

## 2025-07-29 RX ORDER — LISDEXAMFETAMINE DIMESYLATE 30 MG/1
30 CAPSULE ORAL EVERY MORNING
Qty: 30 CAPSULE | Refills: 0 | Status: SHIPPED | OUTPATIENT
Start: 2025-07-29 | End: 2025-08-28

## 2025-07-29 RX ORDER — LISDEXAMFETAMINE DIMESYLATE 30 MG/1
30 CAPSULE ORAL EVERY MORNING
Qty: 30 CAPSULE | Refills: 0 | Status: SHIPPED | OUTPATIENT
Start: 2025-08-28 | End: 2025-09-27

## 2025-07-29 RX ORDER — LISDEXAMFETAMINE DIMESYLATE 30 MG/1
30 CAPSULE ORAL EVERY MORNING
Qty: 30 CAPSULE | Refills: 0 | Status: SHIPPED | OUTPATIENT
Start: 2025-09-27 | End: 2025-10-27

## 2025-10-20 ENCOUNTER — APPOINTMENT (OUTPATIENT)
Dept: PRIMARY CARE | Facility: CLINIC | Age: 30
End: 2025-10-20
Payer: COMMERCIAL

## 2025-10-31 ENCOUNTER — APPOINTMENT (OUTPATIENT)
Dept: PRIMARY CARE | Facility: CLINIC | Age: 30
End: 2025-10-31
Payer: COMMERCIAL